# Patient Record
Sex: MALE | Race: WHITE | NOT HISPANIC OR LATINO | URBAN - METROPOLITAN AREA
[De-identification: names, ages, dates, MRNs, and addresses within clinical notes are randomized per-mention and may not be internally consistent; named-entity substitution may affect disease eponyms.]

---

## 2020-01-01 ENCOUNTER — OFFICE VISIT (OUTPATIENT)
Dept: URBAN - METROPOLITAN AREA CLINIC 40 | Facility: CLINIC | Age: 75
End: 2020-01-01

## 2020-01-01 ENCOUNTER — OFFICE VISIT (OUTPATIENT)
Dept: URBAN - METROPOLITAN AREA CLINIC 40 | Facility: CLINIC | Age: 75
End: 2020-01-01
Payer: MEDICARE

## 2020-01-01 ENCOUNTER — DASHBOARD ENCOUNTERS (OUTPATIENT)
Age: 75
End: 2020-01-01

## 2020-01-01 DIAGNOSIS — K21.9 GERD: ICD-10-CM

## 2020-01-01 DIAGNOSIS — K72.90 HEPATIC ENCEPHALOPATHY: ICD-10-CM

## 2020-01-01 DIAGNOSIS — K86.1 OTHER CHRONIC PANCREATITIS: ICD-10-CM

## 2020-01-01 DIAGNOSIS — K75.81 NASH WITH CIRRHOSIS: ICD-10-CM

## 2020-01-01 PROCEDURE — 3017F COLORECTAL CA SCREEN DOC REV: CPT | Performed by: INTERNAL MEDICINE

## 2020-01-01 PROCEDURE — G8420 CALC BMI NORM PARAMETERS: HCPCS | Performed by: INTERNAL MEDICINE

## 2020-01-01 PROCEDURE — G8427 DOCREV CUR MEDS BY ELIG CLIN: HCPCS | Performed by: INTERNAL MEDICINE

## 2020-01-01 PROCEDURE — G9903 PT SCRN TBCO ID AS NON USER: HCPCS | Performed by: INTERNAL MEDICINE

## 2020-01-01 PROCEDURE — 99214 OFFICE O/P EST MOD 30 MIN: CPT | Performed by: INTERNAL MEDICINE

## 2020-01-01 RX ORDER — TAMSULOSIN HYDROCHLORIDE 0.4 MG/1
CAPSULE ORAL
Qty: 0 | Refills: 0 | Status: ACTIVE | COMMUNITY
Start: 1900-01-01 | End: 1900-01-01

## 2020-01-01 RX ORDER — DUTASTERIDE 0.5 MG
TAKE 1 CAPSULE (0.5 MG) BY ORAL ROUTE ONCE DAILY CAPSULE ORAL 1
Qty: 0 | Refills: 0 | Status: ACTIVE | COMMUNITY
Start: 1900-01-01 | End: 1900-01-01

## 2020-01-01 RX ORDER — RABEPRAZOLE SODIUM 20 MG/1
TAKE 1 TABLET (20 MG) BY ORAL ROUTE ONCE DAILY SWALLOWING WHOLE. DO NOT CRUSH, CHEW AND/OR DIVIDE. FOR 30 DAYS FOR 30 DAYS TABLET, DELAYED RELEASE ORAL
Qty: 90
Start: 2020-01-01

## 2020-01-01 RX ORDER — RIFAXIMIN 550 MG/1
1 TABLET TABLET ORAL TWICE A DAY
Qty: 60 | OUTPATIENT
Start: 2020-01-01 | End: 2020-01-01

## 2020-07-31 PROBLEM — 235494005 CHRONIC PANCREATITIS: Status: ACTIVE | Noted: 2020-01-01

## 2020-07-31 NOTE — PHYSICAL EXAM NEUROLOGIC:
oriented to person, place and time , normal sensation , frequently trails off when trying to express a thought or ask a question

## 2020-07-31 NOTE — PHYSICAL EXAM GASTROINTESTINAL
Abdomen , soft, nontender, obese , no guarding or rigidity , no masses palpable , normal bowel sounds , Liver and Spleen , no hepatomegaly present , no hepatosplenomegaly , liver nontender , spleen not palpable

## 2020-07-31 NOTE — PHYSICAL EXAM CONSTITUTIONAL:
well developed, well nourished , in no acute distress , ambulates with cane , normal communication ability

## 2020-07-31 NOTE — HPI-OTHER HISTORIES
Recall patient with a history of chronic pancreatitis  on Creon. Workup consisted of an abdominal CT scan. The abdominal CT performed on 03/29/2019 , revealed fat stranding about the pancreatic head and adjacent duodenum as well as 2 cm cystic focus in the pancreatic head. 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 Filling defects were noted in the main portal vein in the right hepatic lobe. Areas of subtle bile duct hyperemia noted as well as mild to moderate biliary duct dilation. Recall patient is s/p cholecystectomy for cholelithiasis last June. We had done a CT 8/18 that showed a normal pancreas at that time. EUS with Dr Sequeira on 5/14/19 which showed changes consistent with chronic pancreatitis. At patient's  appointment earlier this year  he complained of abdominal pain, bloating and nausea. We got a CT which shows new onset liver disease as well as ascites. Bladder stones were also noted on the CT. He was started on Lasix 40 mg - he notes abdominal discomfort when more fluid is onboard.  He has since had his urologic surgery. Wife states he is having more issues with confusion and frequent falls. Xifaxan is too expensive and he refuses to try lactulose due to issues with diarrhea. He is due to see his PMD next week.

## 2020-10-27 ENCOUNTER — OFFICE VISIT (OUTPATIENT)
Dept: URBAN - METROPOLITAN AREA CLINIC 40 | Facility: CLINIC | Age: 75
End: 2020-10-27

## 2020-11-06 ENCOUNTER — OFFICE VISIT (OUTPATIENT)
Dept: URBAN - METROPOLITAN AREA CLINIC 40 | Facility: CLINIC | Age: 75
End: 2020-11-06